# Patient Record
Sex: MALE | Race: OTHER | Employment: OTHER | URBAN - METROPOLITAN AREA
[De-identification: names, ages, dates, MRNs, and addresses within clinical notes are randomized per-mention and may not be internally consistent; named-entity substitution may affect disease eponyms.]

---

## 2017-08-25 ENCOUNTER — HOSPITAL ENCOUNTER (EMERGENCY)
Age: 82
Discharge: HOME | End: 2017-08-25
Attending: EMERGENCY MEDICINE
Payer: SELF-PAY

## 2017-08-25 ENCOUNTER — OFFICE VISIT (OUTPATIENT)
Dept: FAMILY MEDICINE CLINIC | Age: 82
End: 2017-08-25

## 2017-08-25 VITALS
WEIGHT: 164 LBS | SYSTOLIC BLOOD PRESSURE: 229 MMHG | OXYGEN SATURATION: 100 % | TEMPERATURE: 97.7 F | DIASTOLIC BLOOD PRESSURE: 87 MMHG | HEART RATE: 62 BPM | BODY MASS INDEX: 29.06 KG/M2 | HEIGHT: 63 IN

## 2017-08-25 VITALS
HEART RATE: 65 BPM | DIASTOLIC BLOOD PRESSURE: 87 MMHG | TEMPERATURE: 98.1 F | SYSTOLIC BLOOD PRESSURE: 219 MMHG | OXYGEN SATURATION: 98 % | RESPIRATION RATE: 12 BRPM

## 2017-08-25 DIAGNOSIS — Z71.9 COUNSELED BY NURSE: Primary | ICD-10-CM

## 2017-08-25 DIAGNOSIS — I10 ESSENTIAL HYPERTENSION: Primary | ICD-10-CM

## 2017-08-25 DIAGNOSIS — N28.9 RENAL INSUFFICIENCY: ICD-10-CM

## 2017-08-25 LAB
ALBUMIN SERPL-MCNC: 3.9 G/DL (ref 3.5–5)
ALBUMIN/GLOB SERPL: 1 {RATIO} (ref 1.1–2.2)
ALP SERPL-CCNC: 107 U/L (ref 45–117)
ALT SERPL-CCNC: 21 U/L (ref 12–78)
ANION GAP SERPL CALC-SCNC: 4 MMOL/L (ref 5–15)
APPEARANCE UR: CLEAR
AST SERPL-CCNC: 17 U/L (ref 15–37)
ATRIAL RATE: 63 BPM
BACTERIA URNS QL MICRO: NEGATIVE /HPF
BASOPHILS # BLD: 0.1 K/UL (ref 0–0.1)
BASOPHILS NFR BLD: 1 % (ref 0–1)
BILIRUB SERPL-MCNC: 0.8 MG/DL (ref 0.2–1)
BILIRUB UR QL: NEGATIVE
BUN SERPL-MCNC: 27 MG/DL (ref 6–20)
BUN/CREAT SERPL: 18 (ref 12–20)
CALCIUM SERPL-MCNC: 9 MG/DL (ref 8.5–10.1)
CALCULATED P AXIS, ECG09: -24 DEGREES
CALCULATED R AXIS, ECG10: -42 DEGREES
CALCULATED T AXIS, ECG11: 79 DEGREES
CHLORIDE SERPL-SCNC: 106 MMOL/L (ref 97–108)
CO2 SERPL-SCNC: 31 MMOL/L (ref 21–32)
COLOR UR: NORMAL
CREAT SERPL-MCNC: 1.5 MG/DL (ref 0.7–1.3)
DIAGNOSIS, 93000: NORMAL
EOSINOPHIL # BLD: 0.3 K/UL (ref 0–0.4)
EOSINOPHIL NFR BLD: 3 % (ref 0–7)
EPITH CASTS URNS QL MICRO: NORMAL /LPF
ERYTHROCYTE [DISTWIDTH] IN BLOOD BY AUTOMATED COUNT: 14.6 % (ref 11.5–14.5)
GLOBULIN SER CALC-MCNC: 4 G/DL (ref 2–4)
GLUCOSE SERPL-MCNC: 87 MG/DL (ref 65–100)
GLUCOSE UR STRIP.AUTO-MCNC: NEGATIVE MG/DL
HCT VFR BLD AUTO: 42.4 % (ref 36.6–50.3)
HGB BLD-MCNC: 14.4 G/DL (ref 12.1–17)
HGB UR QL STRIP: NEGATIVE
HYALINE CASTS URNS QL MICRO: NORMAL /LPF (ref 0–5)
KETONES UR QL STRIP.AUTO: NEGATIVE MG/DL
LEUKOCYTE ESTERASE UR QL STRIP.AUTO: NEGATIVE
LYMPHOCYTES # BLD: 2.1 K/UL (ref 0.8–3.5)
LYMPHOCYTES NFR BLD: 27 % (ref 12–49)
MCH RBC QN AUTO: 28.4 PG (ref 26–34)
MCHC RBC AUTO-ENTMCNC: 34 G/DL (ref 30–36.5)
MCV RBC AUTO: 83.6 FL (ref 80–99)
MONOCYTES # BLD: 0.8 K/UL (ref 0–1)
MONOCYTES NFR BLD: 10 % (ref 5–13)
NEUTS SEG # BLD: 4.6 K/UL (ref 1.8–8)
NEUTS SEG NFR BLD: 59 % (ref 32–75)
NITRITE UR QL STRIP.AUTO: NEGATIVE
P-R INTERVAL, ECG05: 180 MS
PH UR STRIP: 7.5 [PH] (ref 5–8)
PLATELET # BLD AUTO: 233 K/UL (ref 150–400)
POTASSIUM SERPL-SCNC: 4.4 MMOL/L (ref 3.5–5.1)
PROT SERPL-MCNC: 7.9 G/DL (ref 6.4–8.2)
PROT UR STRIP-MCNC: NEGATIVE MG/DL
Q-T INTERVAL, ECG07: 418 MS
QRS DURATION, ECG06: 90 MS
QTC CALCULATION (BEZET), ECG08: 427 MS
RBC # BLD AUTO: 5.07 M/UL (ref 4.1–5.7)
RBC #/AREA URNS HPF: NORMAL /HPF (ref 0–5)
SODIUM SERPL-SCNC: 141 MMOL/L (ref 136–145)
SP GR UR REFRACTOMETRY: 1.01 (ref 1–1.03)
TROPONIN I SERPL-MCNC: <0.04 NG/ML
UROBILINOGEN UR QL STRIP.AUTO: 1 EU/DL (ref 0.2–1)
VENTRICULAR RATE, ECG03: 63 BPM
WBC # BLD AUTO: 7.8 K/UL (ref 4.1–11.1)
WBC URNS QL MICRO: NORMAL /HPF (ref 0–4)

## 2017-08-25 PROCEDURE — 36415 COLL VENOUS BLD VENIPUNCTURE: CPT | Performed by: EMERGENCY MEDICINE

## 2017-08-25 PROCEDURE — 74011250637 HC RX REV CODE- 250/637: Performed by: EMERGENCY MEDICINE

## 2017-08-25 PROCEDURE — 85025 COMPLETE CBC W/AUTO DIFF WBC: CPT | Performed by: EMERGENCY MEDICINE

## 2017-08-25 PROCEDURE — 84484 ASSAY OF TROPONIN QUANT: CPT | Performed by: EMERGENCY MEDICINE

## 2017-08-25 PROCEDURE — 93005 ELECTROCARDIOGRAM TRACING: CPT

## 2017-08-25 PROCEDURE — 99285 EMERGENCY DEPT VISIT HI MDM: CPT

## 2017-08-25 PROCEDURE — 81001 URINALYSIS AUTO W/SCOPE: CPT | Performed by: EMERGENCY MEDICINE

## 2017-08-25 PROCEDURE — 80053 COMPREHEN METABOLIC PANEL: CPT | Performed by: EMERGENCY MEDICINE

## 2017-08-25 RX ORDER — AMLODIPINE BESYLATE 5 MG/1
5 TABLET ORAL
Status: COMPLETED | OUTPATIENT
Start: 2017-08-25 | End: 2017-08-25

## 2017-08-25 RX ORDER — AMLODIPINE BESYLATE 5 MG/1
5 TABLET ORAL DAILY
Qty: 30 TAB | Refills: 0 | Status: SHIPPED | OUTPATIENT
Start: 2017-08-25 | End: 2017-09-05 | Stop reason: SDUPTHER

## 2017-08-25 RX ORDER — CARVEDILOL 6.25 MG/1
6.25 TABLET ORAL EVERY EVENING
COMMUNITY
End: 2017-09-05 | Stop reason: SDUPTHER

## 2017-08-25 RX ORDER — CLOPIDOGREL BISULFATE 75 MG/1
75 TABLET ORAL DAILY
COMMUNITY
End: 2017-09-05 | Stop reason: SDUPTHER

## 2017-08-25 RX ADMIN — AMLODIPINE BESYLATE 5 MG: 5 TABLET ORAL at 12:43

## 2017-08-25 NOTE — ED NOTES
Pt back on room. Just came back from restroom with family. Pt had been taken off monitor. No urine sample collected. Urinal given to pt to attempt to urinate.

## 2017-08-25 NOTE — ED NOTES
Pt had short burst of HR to 125bpm. Not able to catch on EKG in time. Monitor strip printed and MD notified. Pt having no sx.

## 2017-08-25 NOTE — PROGRESS NOTES
Marely Juarez came in to clinic today to see provider, upon intake patient was noted to have abnormal BP, per provider patient to go the ER for further evaluation. Patient was not examined by provider at during this visit. Per daughter patient had a heart attack May 2017, and is currently taking clopidogrel and carvedidol at night, patient states he took his medicines last night. Patient denies chest pain, n/v, headache, or vision changes. rip. Arm strength and no facial droop noted. Per daughter patients arm color is different than baseline per daughter \"his arms are usually pinkish\" hands on examination were cold and purplish. Daughter also states that patient gait has been slower, and patient appetite has decreased in the last few days. ER referral filled and given to patient along with carecard information. Daughter declined for us to call an ambulance and she states that she will drive patient to [facility]. Report was called to [place] around 1004 at [id number].  Chantal Gill RN

## 2017-08-25 NOTE — ED PROVIDER NOTES
HPI Comments: 80 y.o. male with past medical history significant for HTN and questionable previous stroke vs MI in May who presents from home via private vehilce with chief complaint of elevated BP. Per patient's daughter the patient was seen at a local medical caravan earlier today and was found to have elevated systolic BP in the 154'S and was advised to go to the ED for further evaluation and Tx. Daughter reports that while the patient was visiting family in the Hospitals in [place] in May [address] he was seen in the hospital and was Dx with either a stroke or MI, she is unsure of which and reports that he had no Sx upon going to the clinic rather they found that he had evidence of having one of them in the past on his evaluation and lab work. At that time the patient was placed on Plavix and Carvedilol which he takes daily. Currently the patient has no complaints of CP, HA, changes in urine, nausea, vomiting, palpitations. He reports that for the past month he has had nightly ringing in his left ear and increased tearing of his bilateral eyes over the past 2 weeks, but other wise has had no other complaints. He also denies fever, chills, cough, sinus pressure. There are no other acute medical concerns at this time. Social hx: Pt denies smoking cigarettes, drinking alcohol, or using any illicit substances. Note written by harriet Garrison, as dictated by Rosie Kussmaul, MD 11:28 AM        The history is provided by the patient. Past Medical History:   Diagnosis Date    Hypertension     Ill-defined condition     ? MI vs. Stroke. Family uncertain. Pt takes Plavix. Past Surgical History:   Procedure Laterality Date    HX CHOLECYSTECTOMY           History reviewed. No pertinent family history. Social History     Social History    Marital status:      Spouse name: N/A    Number of children: N/A    Years of education: N/A     Occupational History    Not on file.      Social History Main Topics    Smoking status: Never Smoker    Smokeless tobacco: Never Used    Alcohol use No    Drug use: No    Sexual activity: Not on file     Other Topics Concern    Not on file     Social History Narrative         ALLERGIES: Review of patient's allergies indicates no known allergies. Review of Systems   Eyes: Negative for visual disturbance. Cardiovascular: Negative for chest pain. Gastrointestinal: Negative for abdominal distention. Genitourinary: Negative for decreased urine volume. Neurological: Negative for dizziness and headaches. All other systems reviewed and are negative. Vitals:    08/25/17 1043   BP: (!) 150/112   Pulse: 61   Resp: 18   Temp: 98.1 °F (36.7 °C)   SpO2: 99%            Physical Exam   Constitutional: He is oriented to person, place, and time. He appears well-developed and well-nourished. No distress. HENT:   Head: Normocephalic and atraumatic. Right Ear: External ear normal.   Left Ear: External ear normal.   Eyes: Conjunctivae and EOM are normal. Pupils are equal, round, and reactive to light. Neck: Normal range of motion. Neck supple. Cardiovascular: Normal rate, regular rhythm, normal heart sounds and intact distal pulses. Exam reveals no friction rub. No murmur heard. Pulmonary/Chest: Effort normal and breath sounds normal. No respiratory distress. He has no wheezes. He has no rales. He exhibits no tenderness. Abdominal: Soft. Bowel sounds are normal. He exhibits no distension. There is no tenderness. There is no rebound and no guarding. Musculoskeletal: Normal range of motion. He exhibits edema. He exhibits no tenderness. Trace hand and pedal edema b/l   Neurological: He is alert and oriented to person, place, and time. No cranial nerve deficit. He exhibits normal muscle tone. Coordination normal.   Skin: Skin is warm and dry. He is not diaphoretic. No pallor. Psychiatric: He has a normal mood and affect.  His behavior is normal. Nursing note and vitals reviewed. MDM  Number of Diagnoses or Management Options  Essential hypertension:   Renal insufficiency:   Diagnosis management comments: Pt with no sx but elevated bp will monitor here. Asymptomatic. If stays elevated may need additional bp med added. Check creatinine with edema. Daughter mentions pt having baseline elevation in kidney function but is unsure of the number      Given hx ? Mi by family in [place] with no sx will check ekg and troponin    Noise in ear only at night- not c/w aneurysm will need eval by ent including hearing test       Amount and/or Complexity of Data Reviewed  Clinical lab tests: ordered and reviewed  Obtain history from someone other than the patient: yes (daughter)  Independent visualization of images, tracings, or specimens: yes (ekg)    Patient Progress  Patient progress: stable    ED Course       Procedures    EKG interpretation: (Preliminary)  Rhythm: normal sinus rhythm; and regular . Rate (approx.): 60; Axis: normal; P wave: normal; QRS interval: normal ; ST/T wave: T wave inverted; in  Lead:v4 and v5 no reciprocal changes    1:19 PM  Spoke with dr Ceci Sánchez reviewed rhythm strips believes it is sinus tach or maybe PACs. Can follow up as outpt    Spoke with pt and daughter and they agree with follow up for bp recheck in 3-5 days. Discussed returning if devlops cp headache or blurred vision. Creatinine was compared to prior which she ended up having and was unchanged.  Gave cardiology follow up as well and info on care card

## 2017-08-25 NOTE — DISCHARGE INSTRUCTIONS
We hope that we have addressed all of your medical concerns. The examination and treatment you received in the Emergency Department were for an emergent problem and were not intended as complete care. It is important that you follow up with your healthcare provider(s) for ongoing care. If your symptoms worsen or do not improve as expected, and you are unable to reach your usual health care provider(s), you should return to the Emergency Department. Today's healthcare is undergoing tremendous change, and patient satisfaction surveys are one of the many tools to assess the quality of medical care. You may receive a survey from the [employer] regarding your experience in the Emergency Department. I hope that your experience has been completely positive, particularly the medical care that I provided. As such, please participate in the survey; anything less than excellent does not meet my expectations or intentions. [facility] and 8 [facility] participate in nationally recognized quality of care measures. If your blood pressure is greater than 120/80, as reported below, we urge that you seek medical care to address the potential of high blood pressure, commonly known as hypertension. Hypertension can be hereditary or can be caused by certain medical conditions, pain, stress, or \"white coat syndrome. \"       Please make an appointment with your health care provider(s) for follow up of your Emergency Department visit. VITALS:   Patient Vitals for the past 8 hrs:   Temp Pulse Resp BP SpO2   08/25/17 1243 - - - (!) 202/82 -   08/25/17 1215 - 63 14 195/85 99 %   08/25/17 1200 - - - (!) 206/77 97 %   08/25/17 1158 - - 16 - -   08/25/17 1145 - 63 15 (!) 204/82 -   08/25/17 1115 - - - 191/82 -   08/25/17 1043 98.1 °F (36.7 °C) 61 18 (!) 150/112 99 %          Thank you for allowing us to provide you with medical care today.   We realize that you have many choices for your emergency care needs. Please choose us in the future for any continued health care needs. Juan Carlos Warren MD    [facility], [facility].   Office: [phone]            Recent Results (from the past 24 hour(s))   EKG, 12 LEAD, INITIAL    Collection Time: 08/25/17 11:24 AM   Result Value Ref Range    Ventricular Rate 63 BPM    Atrial Rate 63 BPM    P-R Interval 180 ms    QRS Duration 90 ms    Q-T Interval 418 ms    QTC Calculation (Bezet) 427 ms    Calculated P Axis -24 degrees    Calculated R Axis -42 degrees    Calculated T Axis 79 degrees    Diagnosis       Normal sinus rhythm  Left axis deviation  T wave abnormality, consider lateral ischemia  Abnormal ECG  No previous ECGs available     CBC WITH AUTOMATED DIFF    Collection Time: 08/25/17 11:35 AM   Result Value Ref Range    WBC 7.8 4.1 - 11.1 K/uL    RBC 5.07 4. 10 - 5.70 M/uL    HGB 14.4 12.1 - 17.0 g/dL    HCT 42.4 36.6 - 50.3 %    MCV 83.6 80.0 - 99.0 FL    MCH 28.4 26.0 - 34.0 PG    MCHC 34.0 30.0 - 36.5 g/dL    RDW 14.6 (H) 11.5 - 14.5 %    PLATELET 220 998 - 510 K/uL    NEUTROPHILS 59 32 - 75 %    LYMPHOCYTES 27 12 - 49 %    MONOCYTES 10 5 - 13 %    EOSINOPHILS 3 0 - 7 %    BASOPHILS 1 0 - 1 %    ABS. NEUTROPHILS 4.6 1.8 - 8.0 K/UL    ABS. LYMPHOCYTES 2.1 0.8 - 3.5 K/UL    ABS. MONOCYTES 0.8 0.0 - 1.0 K/UL    ABS. EOSINOPHILS 0.3 0.0 - 0.4 K/UL    ABS.  BASOPHILS 0.1 0.0 - 0.1 K/UL   METABOLIC PANEL, COMPREHENSIVE    Collection Time: 08/25/17 11:35 AM   Result Value Ref Range    Sodium 141 136 - 145 mmol/L    Potassium 4.4 3.5 - 5.1 mmol/L    Chloride 106 97 - 108 mmol/L    CO2 31 21 - 32 mmol/L    Anion gap 4 (L) 5 - 15 mmol/L    Glucose 87 65 - 100 mg/dL    BUN 27 (H) 6 - 20 MG/DL    Creatinine 1.50 (H) 0.70 - 1.30 MG/DL    BUN/Creatinine ratio 18 12 - 20      GFR est AA 54 (L) >60 ml/min/1.73m2    GFR est non-AA 45 (L) >60 ml/min/1.73m2    Calcium 9.0 8.5 - 10.1 MG/DL    Bilirubin, total 0.8 0.2 - 1.0 MG/DL    ALT (SGPT) 21 12 - 78 U/L    AST (SGOT) 17 15 - 37 U/L    Alk. phosphatase 107 45 - 117 U/L    Protein, total 7.9 6.4 - 8.2 g/dL    Albumin 3.9 3.5 - 5.0 g/dL    Globulin 4.0 2.0 - 4.0 g/dL    A-G Ratio 1.0 (L) 1.1 - 2.2     TROPONIN I    Collection Time: 08/25/17 11:35 AM   Result Value Ref Range    Troponin-I, Qt. <0.04 <0.05 ng/mL       No results found. Presión arterial celeste: Instrucciones de cuidado - [ High Blood Pressure: Care Instructions ]  Instrucciones de cuidado  Si quezada presión arterial suele ser superior a 140/90, usted tiene presión arterial celeste o hipertensión. [place] significa que el número de [place] es 140 o superior o que el número de abajo es 90 o superior, o ambas cosas. A pesar de lo que mucha gente geovany, la hipertensión no suele causar dolor de cally ni provocar mareos o aturdimiento. Generalmente, no presenta síntomas. Sin embargo, aumenta el riesgo de ataque al corazón, ataque cerebral, y daños en los riñones o en los ojos. A mayor presión arterial, mayor riesgo. Quezada médico le dará walter meta para quezada presión arterial. Quezada meta se basará en quezada mayank y quezada edad. Un ejemplo de meta es mantener quezada presión arterial por debajo de 140/90. Los cambios de estilo de mukesh, eliana alimentarse en forma saludable y [place], siempre son importantes para ayudarle a bajar la presión arterial. También podría oralia medicamentos para lograr quezada meta para la presión arterial.  La atención de seguimiento es walter parte clave de quezada tratamiento y seguridad. Asegúrese de hacer y acudir a todas las citas, y llame a quezada médico si está teniendo problemas. También es walter buena idea saber los resultados de miguel exámenes y mantener walter lista de los medicamentos que hal. ¿Cómo puede cuidarse en el hogar? Tratamiento médico  · Si jaxon de oralia miguel medicamentos, quezada presión arterial volverá a subir.  Es posible que deba oralia un tipo de [place], o más de [place], para reducir la presión arterial. Erica evan con los medicamentos. [place] quezada medicamento exactamente eliana se lo hayan indicado. Llame a quezada médico si geovany estar teniendo problemas con quezada medicamento. · Hable con quezada médico antes de empezar a oralia [employer]. La aspirina puede ayudar a determinadas personas a reducir quezada riesgo de tener un ataque cardíaco o un ataque cerebral. Sana oralia aspirina no es adecuado para todo el TRENGEREID, debido a que puede causar sangrado grave. · Visite a quezada médico periódicamente. Usted podría necesitar consultar a quezada médico con más frecuencia al principio o hasta que se reduzca quezada presión arterial.  · Si usted está tomando medicamentos para la presión arterial, hable con quezada médico antes de oralia medicamentos descongestionantes o antiinflamatorios, eliana ibuprofeno. Algunos de estos medicamentos pueden elevar la presión arterial.  · Aprenda a revisarse la presión arterial en quezada hogar. Cambios en el estilo de mukesh  · Mantenga un peso saludable. [place] es particularmente importante si aumenta de peso en la jesus alberto de la cintura. Bajar solo 10 libras (4.5 kg) puede ayudarle a reducir quezada presión arterial.  · Blayne más ejercicios si quezada médico se lo recomienda. Caminar es walter buena opción. Poco a poco, aumente la distancia que [employer]. Intente hacer por lo menos 30 minutos de ejercicio la mayoría de los días de la [place]. También podría nadar, montar en bicicleta o hacer otras actividades. · No tome alcohol o limite la cantidad que rocael. Hable con quezada médico acerca de si puede oralia alcohol. · Trate de limitar la cantidad de sodio que consume a menos de 2,300 miligramos (mg) al día. Quezada médico podría pedirle que trate de consumir menos de 1,500 mg al día. · Coma abundantes frutas (eliana bananas [plátanos] y naranjas), verduras, legumbres, granos integrales y productos lácteos de bajo contenido de [place] granadoerson. · Reduzca la cantidad de grasas saturadas en quezada dieta.  Los productos derivados de los [place], eliana la Calleen Ready y la carne, contienen grasas saturadas. El limitar estos alimentos podría ayudarle a bajar de peso y [place] reducir quezada riesgo de walter enfermedad cardíaca. · No fume. El hábito de fumar aumenta quezada riesgo de ataque cerebral y ataque al corazón. Si necesita ayuda para dejar de fumar, hable con quezada médico sobre programas y medicamentos para dejar de fumar. Estos pueden aumentar miguel probabilidades de dejar el hábito para siempre. ¿Cuándo debe pedir ayuda? Llame al 911 en cualquier momento que considere que necesita atención de [place]. [place] puede significar que tiene síntomas que sugieren que quezada presión arterial le está causando un problema cardíaco o vascular grave. Quezada presión arterial podría ser superior a 180/110. Por ejemplo, llame al 911 si:  · Tiene síntomas de un ataque al corazón. Estos pueden incluir:  ¨ Dolor o presión en el pecho, o walter sensación extraña en el pecho. ¨ Sudoración. ¨ Falta de aire. ¨ Náuseas o vómito. ¨ Dolor, presión o walter sensación extraña en la espalda, el michael, la mandíbula, la parte superior del abdomen o en julia o ambos hombros o brazos. ¨ Aturdimiento o debilidad repentina. ¨ Latidos del corazón rápidos o irregulares. · Tiene síntomas de un ataque cerebral. Estos pueden incluir:  ¨ Entumecimiento, hormigueo, debilidad o parálisis repentinos en la seema, el brazo o la pierna, sobre todo en un solo lado del cuerpo. ¨ Cambios repentinos en la visión. ¨ Dificultades repentinas para hablar. ¨ Confusión repentina o dificultad súbita para comprender frases sencillas. ¨ Problemas repentinos para caminar o mantener el equilibrio. ¨ Dolor de [place] intenso y repentino, distinto de los hany de cally anteriores. · Tiene un dolor intenso en la espalda o el abdomen. No espere a que la presión arterial baje por sí hermes. Obtenga ayuda de inmediato.   Llame a quezada médico ahora mismo o busque atención de inmediato si:  · Tiene la presión arterial mucho más celeste de lo normal (eliana 180/110 o más celeste), moe no tiene síntomas. · Piensa que la presión arterial celeste le está provocando síntomas, eliana:  ¨ Dolor de cally intenso. [address]. Vigile de cerca los [facility], y asegúrese de comunicarse con quezada médico si:  · Quezada presión arterial mide 140/90 o más en al menos 2 ocasiones. [place] significa que el número de [place] es 140 o superior o el número de abajo es 90 o superior, o ambas cosas. · Sabina que podría estar teniendo efectos secundarios de los medicamentos para la presión arterial.  · Quezada presión arterial suele ser normal, moe se eleva por encima de lo normal en al menos 2 ocasiones. ¿Dónde puede encontrar más información en inglés? Liyah Lora a [email or web address]-caryn.info/. Selena Kelly [id number] en la búsqueda para aprender más acerca de \"Presión arterial celeste: Instrucciones de cuidado - [ High Blood Pressure: Care Instructions ]. \"  Revisado: 8 agosto, 2016  Versión del contenido: 11.3  © 1434-1082 Healthwise, Incorporated. Las instrucciones de cuidado fueron adaptadas bajo licencia por [employer] (which disclaims liability or warranty for this information). Si usted tiene [place] afección médica o sobre estas instrucciones, siempre pregunte a quezada profesional de mayank. Healthwise, Incorporated niega toda garantía o responsabilidad por quezada uso de esta información. La enfermedad renal y la presión arterial celeste: Instrucciones de cuidado - [ Kidney Disease and High Blood Pressure: Care Instructions ]  Instrucciones de cuidado  La enfermedad renal prolongada (crónica) tiene lugar cuando los riñones no pueden eliminar los desechos ni mantener en equilibrio los líquidos y sustancias químicas del organismo. Lo normal es que los riñones [employer] desechos de la radha por medio de la [facility]. Cuando los riñones no están funcionando brenden, los desechos pueden acumularse de mona manera que envenenan el organismo.  La enfermedad renal puede provocar mucho cansancio. También puede causar hinchazón, o edema, en las piernas u otras zonas del cuerpo. La presión arterial celeste es walter de las principales causas de enfermedad renal crónica. Además, la enfermedad renal también puede causar presión arterial celeste. Sin importar cuál ocurrió christopher, tener presión arterial celeste daña los pequeños vasos sanguíneos de los riñones. Si tiene presión arterial celeste, es importante bajarla. Existen muchas cosas que puede hacer para bajar la presión arterial que podrían ayudar a retrasar o detener el daño a los riñones. La atención de seguimiento es walter parte clave de quezada tratamiento y seguridad. Asegúrese de hacer y acudir a todas las citas, y llame a quezada médico si está teniendo problemas. También es walter buena idea saber los resultados de los exámenes y mantener walter lista de los medicamentos que hal. ¿Cómo puede cuidarse en el hogar? · Sea evan con los medicamentos. [place] los medicamentos exactamente eliana se los recetaron. Llame a quezada médico si tiene algún problema con los medicamentos. Es probable que necesite más de un medicamento para bajar la presión arterial. Recibirá más detalles sobre los medicamentos específicos recetados por quezada médico.  · [place] con quezada médico y un dietista para planear comidas que tengan la cantidad [place] de nutrientes para usted. Es probable que necesite limitar la erik, los líquidos y la proteína. · Mantenga un peso saludable. [place] es muy importante si aumenta de peso en la jesus alberto de la cintura. Aunque solo baje 10 libras (4.5 kg) puede ayudarle a bajar quezada presión arterial.  · Maneje otros problemas de mayank eliana la diabetes y el colesterol alto. Usted puede ayudar a reducir quezada riesgo de enfermedad cardíaca y problemas vasculares con un estilo de mukesh saludable junto con medicamentos. · No tome ibuprofeno (Advil, Motrin) o naproxeno (Aleve), o medicamentos similares, a menos que quezada médico se lo indique.  Estos medicamentos podrían empeorar la enfermedad renal crónica. Puede oralia acetaminofén (Tylenol). · Si quezada médico se lo recomienda, harpreet más ejercicio. Caminar es walter buena opción. Poco a poco, aumente la distancia que [employer]. Intente hacer por lo menos 30 minutos de ejercicio la mayoría de los días de la [place]. Quizás también desee nadar, montar en bicicleta o hacer otras actividades. · Limite o evite el alcohol. Hable con quezada médico acerca de si puede oralia cualquier cantidad de alcohol. · No fume ni permita que otros fumen cerca de usted. Si necesita ayuda para dejar de fumar, hable con quezada médico sobre programas y medicamentos para dejar de fumar. Estos pueden aumentar miguel probabilidades de dejar el hábito para siempre. ¿Cuándo debe pedir ayuda? Llame al 911 en cualquier momento que considere que necesita atención de [place]. Por ejemplo, llame si:  · Se desmayó (perdió el conocimiento). · Tiene síntomas de un ataque al corazón, tales eliana:  ¨ Dolor o presión en el pecho. ¨ Sudoración. ¨ Falta de aire. ¨ Náuseas o vómito. ¨ Dolor que se extiende del pecho al michael, la mandíbula o [place] julia o ambos hombros o brazos. ¨ Mareo o aturdimiento. ¨ Pulso rápido o irregular. Después de llamar al 911, mastique 1 aspirina para adultos. Espere walter ambulancia. No trate de conducir un automóvil. Llame a quezada médico ahora mismo o busque atención médica inmediata si:  · Se siente confuso o más débil o cansado de lo habitual.  · Sangra o tiene moretones. Preste especial atención a los cambios en quezada mayank y asegúrese de comunicarse con quezada médico si:  · No quiere comer. · Tiene nueva hinchazón en los brazos o los pies, o la hinchazón que ya tiene [place]OY[place]. · No mejora eliana se esperaba. ¿Dónde puede encontrar más información en inglés? [place] a http://adebayo-[email or web address].[email or web address] Oren Zamarripa [id number] en la búsqueda para aprender más acerca de \"La enfermedad renal y la presión arterial celeste:  Instrucciones de cuidado - [ Kidney Disease and High Blood Pressure: Care Instructions ]. \"  Revisado: 3 jose, 2017  Versión del contenido: 11.3  © 8242-1103 Healthwise, Incorporated. Las instrucciones de cuidado fueron adaptadas bajo licencia por [employer] (which disclaims liability or warranty for this information). Si usted tiene [place] afección médica o sobre estas instrucciones, siempre pregunte a quezada profesional de mayank. Healthwise, Incorporated niega toda garantía o responsabilidad por quezada uso de esta información. Dieta baja en sodio (2,000 miligramos): Instrucciones de cuidado - [ Low Sodium Diet (2,000 Milligram): Care Instructions ]  Instrucciones de cuidado  Demasiado sodio hace que el organismo retenga agua en exceso. [place] puede aumentar la presión arterial y obligar al corazón y a los riñones a trabajar Karalee Purchase. En casos muy graves, podrían tener que hospitalizarlo. Hasta podría poner en peligro la mukesh. Si limita el consumo de sodio, se sentirá mejor y reducirá quezada riesgo de tener problemas graves. La cydney más común de sodio es la sal. Las personas obtienen la mayor parte de la sal en quezada dieta de los alimentos enlatados, preparados y de paquete. La comida rápida y los platillos de restaurante también tienen niveles muy altos de [place]. Es probable que quezada médico le limite el sodio a menos de 2,000 miligramos (mg) al día. Effie límite tiene en cuenta todo el sodio presente en los alimentos preparados y de [place] [employer] force, y toda la sal que añada a miguel alimentos. La atención de seguimiento es walter parte clave de quezada tratamiento y seguridad. Asegúrese de hacer y acudir a todas las citas, y llame a quezada médico si está teniendo problemas. También es walter buena idea saber los resultados de miguel exámenes y mantener walter lista de los medicamentos que hal. ¿Cómo puede cuidarse en el hogar? Marilou las etiquetas de los alimentos  · Marilou las etiquetas de las latas y los alimentos de paquete.  La [employer] qué cantidad de sodio (\"sodium\") hay en cada porción. Asegúrese de fijarse en el tamaño de la porción. Si usted come [employer] porción, consumirá Marketing Munch. · Las etiquetas de los alimentos también indican el Porcentaje del Valor Diario (\"Percent Daily Value\") recomendado para el sodio. Escoja productos con un bajo Porcentaje del Valor Diario de Dye. · Tenga en cuenta que el sodio puede venir en otras formas además de la sal, entre las que se incluyen el glutamato monosódico (MSG, por miguel siglas en inglés), el citrato de sodio y el bicarbonato de [place]. La comida asiática frecuentemente contiene MSG añadido. Si sale a comer, a veces puede pedir que no le pongan MSG ni sal a miguel alimentos. Compre alimentos bajos en sodio  · Compre alimentos que indiquen en la etiqueta \"sin sal\" (\"unsalted\") (sin sal añadida), \"sin sodio\" (\"sodium-free\") (menos de 5 mg de sodio por porción) o \"bajo en sodio\" (\"low-sodium\") (menos de 140 mg de sodio por porción). Los alimentos que indiquen en la etiqueta \"reducido en sodio\" (\"reduced-sodium\") y \"ligero contenido de sodio\" (\"light sodium\") aún pueden contener demasiado sodio. Asegúrese de leer la etiqueta para verificar cuánto sodio está consumiendo. · Compre verduras frescas o congeladas que no tengan salsas [id number]. Compre las versiones de bajo sodio de verduras [place], sopas y otros productos enlatados. Prepare comidas bajas en sodio  · Reduzca la cantidad de sal que [place] para cocinar. [place] le ayudará a acostumbrarse al [employer]. No añada erik después de mary alice cocinado. Michelle cucharadita de sal contiene alrededor de 2,300 mg de sodio. · Retire el salero de la james. · Sazone miguel comidas con ajo, jugo de alicia, cebolla, vinagre, hierbas y especias. No use salsa de soya, salsa de soya \"lite\", salsa para jono, sal de cebolla, sal de ajo o de apio, mostaza ni ketchup (salsa de tomate) en miguel comidas. · Use aderezos para ensaladas, salsas y ketchup de bajo contenido de [place].  O prepare miguel propios aderezos para ensaladas y salsas sin añadir sal.  · Use menos sal (o nada) cuando la receta lo pida. Por lo general puede añadir la mitad de la cantidad de erik que pide la receta sin que esta pierda el sabor. Otros alimentos eliana el arroz, las pastas y los cereales no necesitan sal adicional.  · Enjuague las verduras enlatadas y cocínelas en agua fresca. [place] le rebeca algo de sal, moe no toda. · Evite el agua que naturalmente tenga un alto contenido de sodio o que haya sido tratada con ablandadores, los cuales [place]. Llame a quezada compañía de agua local para averiguar cuál es el contenido de sodio del agua. Si compra agua embotellada, valentino la etiqueta y escoja walter lucrecia sin sodio. Evite los alimentos altos en sodio  · Evite comer:  ¨ Brennon, pescados y aves [place], curados, salados y [place]. ¨ Jamón, tocino, perros calientes (\"hot dogs\") y brennon frías. ¨ Queso común, cherise y procesado y mantequilla de cacahuate (maní) común. ¨ Galletas saladas y otros refrigerios salados eliana \"pretzels\", \"chips\" (eliana chelsie fritas) y palomitas de maíz saladas. ¨ Comidas preparadas y congeladas, a menos que tengan walter etiqueta que diga \"bajo en sodio\" (\"low-sodium\"). ¨ Sopas, caldos y consomés enlatados y secos o deshidratados, a menos que digan \"sin sodio\" (\"sodium-free\") o \"bajo en sodio\" (\"low-sodium\"). ¨ Verduras enlatadas, a menos que digan \"sin sodio\" (\"sodium-free\") o \"bajo en sodio\" (\"low-sodium\"). ¨ Chelsie fritas (a la francesa), pizzas, tacos y otras comidas rápidas. ¨ Pepinillos, aceitunas, ketchup y otros condimentos, en especial la salsa de soya, a menos que digan \"sin sodio\" (\"sodium-free\") o \"bajo en sodio\" (\"low-sodium\"). ¿Dónde puede encontrar más información en inglés? [place] [employer] a [email or web address]-caryn.info/. Elinda Gudino [id number] en la búsqueda para aprender más acerca de \"Dieta baja en sodio (2,000 miligramos):  Instrucciones de cuidado - [ Low Sodium Diet (2,000 Milligram): Care Instructions ]. \"  Revisado: 26 julio, 2016  Versión del contenido: 11.3  © 9067-1846 Healthwise, Incorporated. Las instrucciones de cuidado fueron adaptadas bajo licencia por [employer] (which disclaims liability or warranty for this information). Si usted tiene [place] afección médica o sobre estas instrucciones, siempre pregunte a quezada profesional de mayank. Healthwise, Incorporated niega toda garantía o responsabilidad por quezada uso de esta información.

## 2017-09-05 ENCOUNTER — OFFICE VISIT (OUTPATIENT)
Dept: FAMILY MEDICINE CLINIC | Age: 82
End: 2017-09-05

## 2017-09-05 VITALS
HEART RATE: 82 BPM | DIASTOLIC BLOOD PRESSURE: 82 MMHG | SYSTOLIC BLOOD PRESSURE: 149 MMHG | WEIGHT: 167 LBS | TEMPERATURE: 98.4 F | BODY MASS INDEX: 29.78 KG/M2

## 2017-09-05 DIAGNOSIS — I10 ESSENTIAL HYPERTENSION: Primary | ICD-10-CM

## 2017-09-05 DIAGNOSIS — H10.13 ACUTE ATOPIC CONJUNCTIVITIS OF BOTH EYES: ICD-10-CM

## 2017-09-05 RX ORDER — CARVEDILOL 6.25 MG/1
6.25 TABLET ORAL EVERY EVENING
Qty: 30 TAB | Refills: 3 | Status: SHIPPED | OUTPATIENT
Start: 2017-09-05 | End: 2017-11-14 | Stop reason: SDUPTHER

## 2017-09-05 RX ORDER — CLOPIDOGREL BISULFATE 75 MG/1
75 TABLET ORAL DAILY
Qty: 30 TAB | Refills: 3 | Status: SHIPPED | OUTPATIENT
Start: 2017-09-05 | End: 2017-11-14 | Stop reason: SDUPTHER

## 2017-09-05 RX ORDER — AMLODIPINE BESYLATE 5 MG/1
5 TABLET ORAL DAILY
Qty: 30 TAB | Refills: 3 | Status: SHIPPED | OUTPATIENT
Start: 2017-09-05 | End: 2017-11-14 | Stop reason: SDUPTHER

## 2017-09-05 RX ORDER — KETOTIFEN FUMARATE 0.35 MG/ML
1 SOLUTION/ DROPS OPHTHALMIC 2 TIMES DAILY
Qty: 5 ML | Refills: 0 | Status: SHIPPED | OUTPATIENT
Start: 2017-09-05 | End: 2017-09-15

## 2017-09-05 NOTE — PROGRESS NOTES
Hector Restrepo seen at d/c, given AVS and reviewed today's visit with patient. Looked up all [employer]res Commercial Real Estate Corporationx prices for this patient using WhenSoon sai on my iphone since patient's daughter has the sai but did not have internet access with her at the time of this visit. This has been fully explained to the patient, who indicates understanding.

## 2017-09-05 NOTE — PROGRESS NOTES
Assessment/Plan:       ICD-10-CM ICD-9-CM    1. Essential hypertension I10 401.9 clopidogrel (PLAVIX) 75 mg tab      carvedilol (COREG) 6.25 mg tablet      amLODIPine (NORVASC) 5 mg tablet   2. Acute atopic conjunctivitis of both eyes H10.13 372.05 ketotifen (ZADITOR) 0.025 % (0.035 %) ophthalmic solution       [address] [facility], [address] [address]  Phone: [phone] Fax: [phone]      [facility]-  [address]  Subjective:     Chief Complaint   Patient presents with   [facility] Follow Up     patient is here to f/u on ED visit for hypertension.  Medication Refill    Nic Camejo is a 80 y.o. OTHER male. HPI:  He  has a past medical history of Hypertension and Ill-defined condition. He also has no past medical history of Diabetes ([address] He has Essential hypertension on his problem list. He has had much pain in the legs since today. Review of Systems: Negative for: fever, chest pain, shortness of breath, leg swelling, exertional dyspnea, palpitations. Current Medications:   No current outpatient prescriptions on file prior to visit. No current facility-administered medications on file prior to visit. In May he had a mini stroke. Past Surgical History: He  has a past surgical history that includes cholecystectomy. Social and Family History: He  reports that he has never smoked. He has never used smokeless tobacco. He reports that he does not drink alcohol or use illicit drugs. ; family history is not on file. Objective:     Vitals:    09/05/17 1226   BP: 149/82   Pulse: 82   Temp: 98.4 °F (36.9 °C)   TempSrc: Oral   Weight: 167 lb (75.8 kg)    No LMP for male patient. Wt Readings from Last 2 Encounters:   09/05/17 167 lb (75.8 kg)   08/25/17 164 lb (74.4 kg)     No results found for any visits on 09/05/17. Physical Examination:  General appearance - well developed, no acute distress.    Chest - clear to auscultation. Heart - regular rate and rhythm without murmurs, rubs, or gallops. Abdomen - bowel sounds present x 4, NT, ND. Extremities - pulses intact. No peripheral edema. Assessment/Plan:   Diagnoses and all orders for this visit:    1. Essential hypertension  -     clopidogrel (PLAVIX) 75 mg tab; Take 1 Tab by mouth daily. -     carvedilol (COREG) 6.25 mg tablet; Take 1 Tab by mouth every evening.  -     amLODIPine (NORVASC) 5 mg tablet; Take 1 Tab by mouth daily. 2. Acute atopic conjunctivitis of both eyes  -     ketotifen (ZADITOR) 0.025 % (0.035 %) ophthalmic solution; Administer 1 Drop to both eyes two (2) times a day for 10 days. Follow-up Disposition:  Return for hypertension. Regis Ramirez, DNP, FNP-BC, BC-ADM  Yancy Patel expressed understanding of this plan.

## 2017-09-05 NOTE — PROGRESS NOTES
Coordination of Care  1. Have you been to the ER, urgent care clinic since your last visit? Hospitalized since your last visit? Yes When: SFM, elevated blood pressure, 08/25/2017    2. Have you seen or consulted any other health care providers outside of the [address] [place] [address] since your last visit? Include any pap smears or colon screening. No    Medications  Medication Reconciliation Performed: no  Patient does need refills     Learning Assessment Complete?  yes

## 2017-11-14 ENCOUNTER — HOSPITAL ENCOUNTER (OUTPATIENT)
Dept: LAB | Age: 82
Discharge: HOME | End: 2017-11-14

## 2017-11-14 ENCOUNTER — OFFICE VISIT (OUTPATIENT)
Dept: FAMILY MEDICINE CLINIC | Age: 82
End: 2017-11-14

## 2017-11-14 VITALS
TEMPERATURE: 98.5 F | OXYGEN SATURATION: 96 % | SYSTOLIC BLOOD PRESSURE: 167 MMHG | HEART RATE: 65 BPM | WEIGHT: 163 LBS | BODY MASS INDEX: 29.06 KG/M2 | DIASTOLIC BLOOD PRESSURE: 89 MMHG

## 2017-11-14 DIAGNOSIS — Z23 ENCOUNTER FOR IMMUNIZATION: ICD-10-CM

## 2017-11-14 DIAGNOSIS — M54.50 ACUTE LEFT-SIDED LOW BACK PAIN WITHOUT SCIATICA: ICD-10-CM

## 2017-11-14 DIAGNOSIS — I10 ESSENTIAL HYPERTENSION: Primary | ICD-10-CM

## 2017-11-14 DIAGNOSIS — N28.9 RENAL INSUFFICIENCY: ICD-10-CM

## 2017-11-14 LAB
ALBUMIN SERPL-MCNC: 4.2 G/DL (ref 3.5–5)
ALBUMIN/GLOB SERPL: 1.1 {RATIO} (ref 1.1–2.2)
ALP SERPL-CCNC: 120 U/L (ref 45–117)
ALT SERPL-CCNC: 27 U/L (ref 12–78)
ANION GAP SERPL CALC-SCNC: 7 MMOL/L (ref 5–15)
AST SERPL-CCNC: 16 U/L (ref 15–37)
BILIRUB SERPL-MCNC: 0.8 MG/DL (ref 0.2–1)
BUN SERPL-MCNC: 24 MG/DL (ref 6–20)
BUN/CREAT SERPL: 18 (ref 12–20)
CALCIUM SERPL-MCNC: 9 MG/DL (ref 8.5–10.1)
CHLORIDE SERPL-SCNC: 104 MMOL/L (ref 97–108)
CO2 SERPL-SCNC: 28 MMOL/L (ref 21–32)
CREAT SERPL-MCNC: 1.37 MG/DL (ref 0.7–1.3)
GLOBULIN SER CALC-MCNC: 3.8 G/DL (ref 2–4)
GLUCOSE SERPL-MCNC: 78 MG/DL (ref 65–100)
POTASSIUM SERPL-SCNC: 4.2 MMOL/L (ref 3.5–5.1)
PROT SERPL-MCNC: 8 G/DL (ref 6.4–8.2)
SODIUM SERPL-SCNC: 139 MMOL/L (ref 136–145)

## 2017-11-14 PROCEDURE — 80053 COMPREHEN METABOLIC PANEL: CPT | Performed by: NURSE PRACTITIONER

## 2017-11-14 RX ORDER — CLOPIDOGREL BISULFATE 75 MG/1
75 TABLET ORAL DAILY
Qty: 90 TAB | Refills: 0 | Status: SHIPPED | OUTPATIENT
Start: 2017-11-14 | End: 2018-02-13 | Stop reason: SDUPTHER

## 2017-11-14 RX ORDER — CARVEDILOL 6.25 MG/1
6.25 TABLET ORAL 2 TIMES DAILY
Qty: 180 TAB | Refills: 0 | Status: SHIPPED | OUTPATIENT
Start: 2017-11-14

## 2017-11-14 RX ORDER — AMLODIPINE BESYLATE 5 MG/1
5 TABLET ORAL DAILY
Qty: 90 TAB | Refills: 0 | Status: SHIPPED | OUTPATIENT
Start: 2017-11-14 | End: 2017-11-14 | Stop reason: SDUPTHER

## 2017-11-14 RX ORDER — AMLODIPINE BESYLATE 5 MG/1
5 TABLET ORAL DAILY
Qty: 90 TAB | Refills: 0 | Status: SHIPPED | OUTPATIENT
Start: 2017-11-14

## 2017-11-14 NOTE — PROGRESS NOTES
Requests flu vaccine; denies fever, egg allergy. [facility] V[facility] information sheet given, explained possible S/E. Reviewed sx indicating need to be seen in ER. Pt had no adverse reaction at time of discharge.  Lizzy Nguyễn RN

## 2017-11-14 NOTE — PROGRESS NOTES
Coordination of Care  1. Have you been to the ER, urgent care clinic since your last visit? Hospitalized since your last visit? No    2. Have you seen or consulted any other health care providers outside of the [address] [place] [address] since your last visit? Include any pap smears or colon screening. No    Medications  Does the patient need refills? YES    Learning Assessment Complete?  yes

## 2017-11-14 NOTE — PROGRESS NOTES
Assessment/Plan:       ICD-10-CM ICD-9-CM    1. Essential hypertension I10 401.9 carvedilol (COREG) 6.25 mg tablet      clopidogrel (PLAVIX) 75 mg tab      amLODIPine (NORVASC) 5 mg tablet      DISCONTINUED: amLODIPine (NORVASC) 5 mg tablet   2. Renal insufficiency A57.3 046.8 METABOLIC PANEL, COMPREHENSIVE      METABOLIC PANEL, COMPREHENSIVE   3. Acute left-sided low back pain without sciatica M54.5 724.2      Follow-up Disposition: Not on File    Subjective:   Eboni Boucher is a 80 y.o. male who presents for follow up of   Here with daughter Darian Whitman. Chief Complaint   Patient presents with    Hypertension     f/u    He also  has a past medical history of Hypertension and Ill-defined condition. He also has no past medical history of Diabetes ([address] .  HPI: back pain increases with movement. Last took medication: today  Measuring blood pressures outside of clinic: yes, 149/89   Working? No Physical activity: no  Healthy diet? Low sodium   Family History: family history is not on file. Social History:  reports that he has never smoked. He has never used smokeless tobacco. He reports that he does not drink alcohol or use illicit drugs. Cardiovascular ROS: no chest pain on exertion, no dyspnea on exertion, no swelling of ankles. Objective:     Vitals:    11/14/17 1004 11/14/17 1008   BP: 182/83 167/89   Pulse: 67 65   Temp: 98.5 °F (36.9 °C)    TempSrc: Oral    SpO2: 96%    Weight: 163 lb (73.9 kg)      Wt Readings from Last 2 Encounters:   11/14/17 163 lb (73.9 kg)   09/05/17 167 lb (75.8 kg)     No visits with results within 1 Month(s) from this visit.   Latest known visit with results is:    Admission on 08/25/2017, Discharged on 08/25/2017   Component Date Value Ref Range Status    Ventricular Rate 08/25/2017 63  BPM Final    Atrial Rate 08/25/2017 63  BPM Final    P-R Interval 08/25/2017 180  ms Final    QRS Duration 08/25/2017 90  ms Final    Q-T Interval 08/25/2017 418  ms Final    QTC Calculation (Bezet) 08/25/2017 427  ms Final    Calculated P Axis 08/25/2017 -24  degrees Final    Calculated R Axis 08/25/2017 -42  degrees Final    Calculated T Axis 08/25/2017 79  degrees Final    Diagnosis 08/25/2017    Final                    Value:Normal sinus rhythm  Left axis deviation  T wave abnormality, consider lateral ischemia  Abnormal ECG  No previous ECGs available  Confirmed by [id number], Sandrine GARCIA, Marques ([id number]) on 8/25/2017 1:15:39 PM      WBC 08/25/2017 7.8  4.1 - 11.1 K/uL Final    RBC 08/25/2017 5.07  4. 10 - 5.70 M/uL Final    HGB 08/25/2017 14.4  12.1 - 17.0 g/dL Final    HCT 08/25/2017 42.4  36.6 - 50.3 % Final    MCV 08/25/2017 83.6  80.0 - 99.0 FL Final    MCH 08/25/2017 28.4  26.0 - 34.0 PG Final    MCHC 08/25/2017 34.0  30.0 - 36.5 g/dL Final    RDW 08/25/2017 14.6* 11.5 - 14.5 % Final    PLATELET 70/36/2954 582  150 - 400 K/uL Final    NEUTROPHILS 08/25/2017 59  32 - 75 % Final    LYMPHOCYTES 08/25/2017 27  12 - 49 % Final    MONOCYTES 08/25/2017 10  5 - 13 % Final    EOSINOPHILS 08/25/2017 3  0 - 7 % Final    BASOPHILS 08/25/2017 1  0 - 1 % Final    ABS. NEUTROPHILS 08/25/2017 4.6  1.8 - 8.0 K/UL Final    ABS. LYMPHOCYTES 08/25/2017 2.1  0.8 - 3.5 K/UL Final    ABS. MONOCYTES 08/25/2017 0.8  0.0 - 1.0 K/UL Final    ABS. EOSINOPHILS 08/25/2017 0.3  0.0 - 0.4 K/UL Final    ABS.  BASOPHILS 08/25/2017 0.1  0.0 - 0.1 K/UL Final    Sodium 08/25/2017 141  136 - 145 mmol/L Final    Potassium 08/25/2017 4.4  3.5 - 5.1 mmol/L Final    Chloride 08/25/2017 106  97 - 108 mmol/L Final    CO2 08/25/2017 31  21 - 32 mmol/L Final    Anion gap 08/25/2017 4* 5 - 15 mmol/L Final    Glucose 08/25/2017 87  65 - 100 mg/dL Final    BUN 08/25/2017 27* 6 - 20 MG/DL Final    Creatinine 08/25/2017 1.50* 0.70 - 1.30 MG/DL Final    BUN/Creatinine ratio 08/25/2017 18  12 - 20   Final    GFR est AA 08/25/2017 54* >60 ml/min/1.73m2 Final    GFR est non-AA 08/25/2017 45* >60 ml/min/1.73m2 Final Comment: Estimated GFR is calculated using the IDMS-traceable Modification of Diet in Renal Disease (MDRD) Study equation, reported for both  Americans (GFRAA) and non- Americans (GFRNA), and normalized to 1.73m2 body surface area. The physician must decide which value applies to the patient. The MDRD study equation should only be used in individuals age 25 or older. It has not been validated for the following: pregnant women, patients with serious comorbid conditions, or on certain medications, or persons with extremes of body size, muscle mass, or nutritional status.  Calcium 08/25/2017 9.0  8.5 - 10.1 MG/DL Final    Bilirubin, total 08/25/2017 0.8  0.2 - 1.0 MG/DL Final    ALT (SGPT) 08/25/2017 21  12 - 78 U/L Final    AST (SGOT) 08/25/2017 17  15 - 37 U/L Final    Alk. phosphatase 08/25/2017 107  45 - 117 U/L Final    Protein, total 08/25/2017 7.9  6.4 - 8.2 g/dL Final    Albumin 08/25/2017 3.9  3.5 - 5.0 g/dL Final    Globulin 08/25/2017 4.0  2.0 - 4.0 g/dL Final    A-G Ratio 08/25/2017 1.0* 1.1 - 2.2   Final    Troponin-I, Qt. 08/25/2017 <0.04  <0.05 ng/mL Final    Comment: The presence of detectable troponin above the reference range indicates myocardial injury which may be due to ischemia, myocarditis, trauma, etc.  Clinical correlation is necessary to establish the significance of this finding. Sequential testing is recommended to determine if the typical rise and fall of cTnI is demonstrated. Note:  Cardiac troponin I has a relatively long half life and may be present well after the CK MB has returned to baseline. The reference range is based on the 99th percentile of the referent population.       Color 08/25/2017 YELLOW/STRAW    Final    Color Reference Range: Straw, Yellow or Dark Yellow    Appearance 08/25/2017 CLEAR  CLEAR   Final    Specific gravity 08/25/2017 1.014  1.003 - 1.030   Final    pH (UA) 08/25/2017 7.5  5.0 - 8.0   Final    Protein 08/25/2017 NEGATIVE NEG mg/dL Final    Glucose 08/25/2017 NEGATIVE   NEG mg/dL Final    Ketone 08/25/2017 NEGATIVE   NEG mg/dL Final    Bilirubin 08/25/2017 NEGATIVE   NEG   Final    Blood 08/25/2017 NEGATIVE   NEG   Final    Urobilinogen 08/25/2017 1.0  0.2 - 1.0 EU/dL Final    Nitrites 08/25/2017 NEGATIVE   NEG   Final    Leukocyte Esterase 08/25/2017 NEGATIVE   NEG   Final    WBC 08/25/2017 0-4  0 - 4 /hpf Final    RBC 08/25/2017 0-5  0 - 5 /hpf Final    Epithelial cells 08/25/2017 FEW  FEW /lpf Final    Epithelial cell category consists of squamous cells and /or transitional urothelial cells. Renal tubular cells, if present, are separately identified as such.  Bacteria 08/25/2017 NEGATIVE   NEG /hpf Final    Hyaline cast 08/25/2017 0-2  0 - 5 /lpf Final     Labs discussed with patient. Appearance: alert, well appearing, and in no distress. General exam: CVS exam BP noted to be moderately elevated today in office, S1, S2 normal, no gallop, no murmur, chest clear, no JVD, no HSM, no edema, fundi - normal. No CVAT. Assessment/Plan:   Hypertension in fair control. Diagnoses and all orders for this visit:    1. Essential hypertension  -     carvedilol (COREG) 6.25 mg tablet; Take 1 Tab by mouth two (2) times a day. -     clopidogrel (PLAVIX) 75 mg tab; Take 1 Tab by mouth daily. -     amLODIPine (NORVASC) 5 mg tablet; Take 1 Tab by mouth daily. 2. Renal insufficiency  -     METABOLIC PANEL, COMPREHENSIVE; Future    3. Acute left-sided low back pain without sciatica    Tylenol for pain. 500mg no more than 3 times a day.   Follow-up Disposition: Not on File

## 2017-11-14 NOTE — PROGRESS NOTES
Reviewed AVS, prescription and pharmacy location with patient. Amlodipine Rx reordered to [facility] per patient's request due to price. Goodrx coupons printed and given to patient. Instructed to give to pharmacist to receive discount in price. Pt aware that he will need to follow up with the provider in about 3 months. Patient verbalized understanding . No questions or concern from patient at this time.  Mae Altamirano RN

## 2018-02-06 ENCOUNTER — TELEPHONE (OUTPATIENT)
Dept: FAMILY MEDICINE CLINIC | Age: 83
End: 2018-02-06

## 2018-02-07 NOTE — TELEPHONE ENCOUNTER
Patient may be out of his Plavix. I wanted to make sure he does not run out of this medication. Also, have him please return for evaluation.

## 2018-02-07 NOTE — TELEPHONE ENCOUNTER
Per Blessing Laureano review, there are no available provider appointments before this patient is likely to be out of Plavix. His 90 prescription was written 11-14-17. Do we overbook or ask him to make the line? (Pt is 80 y)Routing to Dr. Avani Lopez for review.  Alexis Silverio RN

## 2018-02-13 DIAGNOSIS — I25.10 CORONARY ARTERY DISEASE INVOLVING NATIVE HEART, ANGINA PRESENCE UNSPECIFIED, UNSPECIFIED VESSEL OR LESION TYPE: Primary | ICD-10-CM

## 2018-02-13 DIAGNOSIS — I10 ESSENTIAL HYPERTENSION: ICD-10-CM

## 2018-02-13 RX ORDER — CLOPIDOGREL BISULFATE 75 MG/1
75 TABLET ORAL DAILY
Qty: 30 TAB | Refills: 0 | Status: SHIPPED | OUTPATIENT
Start: 2018-02-13

## 2018-02-13 NOTE — TELEPHONE ENCOUNTER
Dr. Gayle Bingham,  The patient is on plavix, according to the daughter, because while  the patient was [place], he was seen in the hospital and was Dx with either a stroke or MI. The daughter does not know what the diagnosis was. She reports the patient had no Sx upon going to the clinic; rather, they found that he had evidence of having either a stroke or MI in the past on his evaluation and lab work. At that time the patient was placed on Plavix and Carvedilol which he takes daily.

## 2018-02-14 PROBLEM — Z86.73 HISTORY OF TIA (TRANSIENT ISCHEMIC ATTACK): Status: ACTIVE | Noted: 2018-02-14
